# Patient Record
Sex: MALE | Race: WHITE | Employment: OTHER | URBAN - METROPOLITAN AREA
[De-identification: names, ages, dates, MRNs, and addresses within clinical notes are randomized per-mention and may not be internally consistent; named-entity substitution may affect disease eponyms.]

---

## 2017-05-13 PROCEDURE — 88305 TISSUE EXAM BY PATHOLOGIST: CPT | Performed by: INTERNAL MEDICINE

## 2017-05-17 ENCOUNTER — ANESTHESIA EVENT (OUTPATIENT)
Dept: SURGERY | Facility: HOSPITAL | Age: 72
End: 2017-05-17

## 2017-05-17 ENCOUNTER — SURGERY (OUTPATIENT)
Age: 72
End: 2017-05-17

## 2017-05-17 ENCOUNTER — HOSPITAL ENCOUNTER (OUTPATIENT)
Facility: HOSPITAL | Age: 72
Setting detail: HOSPITAL OUTPATIENT SURGERY
Discharge: HOME | End: 2017-05-17
Attending: OTOLARYNGOLOGY | Admitting: OTOLARYNGOLOGY
Payer: COMMERCIAL

## 2017-05-17 ENCOUNTER — ANESTHESIA (OUTPATIENT)
Dept: SURGERY | Facility: HOSPITAL | Age: 72
End: 2017-05-17

## 2017-05-17 VITALS
DIASTOLIC BLOOD PRESSURE: 74 MMHG | SYSTOLIC BLOOD PRESSURE: 152 MMHG | BODY MASS INDEX: 24.03 KG/M2 | RESPIRATION RATE: 16 BRPM | WEIGHT: 162.25 LBS | TEMPERATURE: 98 F | OXYGEN SATURATION: 97 % | HEIGHT: 69 IN | HEART RATE: 63 BPM

## 2017-05-17 DIAGNOSIS — E21.3 HYPERPARATHYROIDISM (HCC): ICD-10-CM

## 2017-05-17 PROCEDURE — 88332 PATH CONSLTJ SURG EA ADD BLK: CPT | Performed by: OTOLARYNGOLOGY

## 2017-05-17 PROCEDURE — 88331 PATH CONSLTJ SURG 1 BLK 1SPC: CPT | Performed by: OTOLARYNGOLOGY

## 2017-05-17 PROCEDURE — 88305 TISSUE EXAM BY PATHOLOGIST: CPT | Performed by: OTOLARYNGOLOGY

## 2017-05-17 PROCEDURE — 83970 ASSAY OF PARATHORMONE: CPT | Performed by: OTOLARYNGOLOGY

## 2017-05-17 PROCEDURE — 0GTP0ZZ RESECTION OF LEFT INFERIOR PARATHYROID GLAND, OPEN APPROACH: ICD-10-PCS | Performed by: OTOLARYNGOLOGY

## 2017-05-17 RX ORDER — CLINDAMYCIN PHOSPHATE 900 MG/50ML
INJECTION INTRAVENOUS
Status: DISCONTINUED
Start: 2017-05-17 | End: 2017-05-17

## 2017-05-17 RX ORDER — MULTIVITAMIN WITH IRON
100 TABLET ORAL DAILY
COMMUNITY
End: 2022-02-02 | Stop reason: ALTCHOICE

## 2017-05-17 RX ORDER — SODIUM CHLORIDE, SODIUM LACTATE, POTASSIUM CHLORIDE, CALCIUM CHLORIDE 600; 310; 30; 20 MG/100ML; MG/100ML; MG/100ML; MG/100ML
INJECTION, SOLUTION INTRAVENOUS CONTINUOUS
Status: DISCONTINUED | OUTPATIENT
Start: 2017-05-17 | End: 2017-05-17

## 2017-05-17 RX ORDER — HYDROCODONE BITARTRATE AND ACETAMINOPHEN 5; 325 MG/1; MG/1
2 TABLET ORAL AS NEEDED
Status: DISCONTINUED | OUTPATIENT
Start: 2017-05-17 | End: 2017-05-17

## 2017-05-17 RX ORDER — HYDROMORPHONE HYDROCHLORIDE 1 MG/ML
INJECTION, SOLUTION INTRAMUSCULAR; INTRAVENOUS; SUBCUTANEOUS
Status: COMPLETED
Start: 2017-05-17 | End: 2017-05-17

## 2017-05-17 RX ORDER — HYDROCODONE BITARTRATE AND ACETAMINOPHEN 5; 325 MG/1; MG/1
1 TABLET ORAL AS NEEDED
Status: DISCONTINUED | OUTPATIENT
Start: 2017-05-17 | End: 2017-05-17

## 2017-05-17 RX ORDER — HYDRALAZINE HYDROCHLORIDE 20 MG/ML
INJECTION INTRAMUSCULAR; INTRAVENOUS
Status: COMPLETED
Start: 2017-05-17 | End: 2017-05-17

## 2017-05-17 RX ORDER — CLINDAMYCIN PHOSPHATE 900 MG/50ML
900 INJECTION INTRAVENOUS ONCE
Status: COMPLETED | OUTPATIENT
Start: 2017-05-17 | End: 2017-05-17

## 2017-05-17 RX ORDER — CALCIUM CARBONATE 500(1250)
500 TABLET ORAL
Qty: 42 TABLET | Refills: 0 | Status: SHIPPED | OUTPATIENT
Start: 2017-05-17 | End: 2017-05-31

## 2017-05-17 RX ORDER — ASPIRIN 81 MG/1
81 TABLET ORAL DAILY
Status: ON HOLD | COMMUNITY
End: 2017-05-17

## 2017-05-17 RX ORDER — NALOXONE HYDROCHLORIDE 0.4 MG/ML
80 INJECTION, SOLUTION INTRAMUSCULAR; INTRAVENOUS; SUBCUTANEOUS AS NEEDED
Status: DISCONTINUED | OUTPATIENT
Start: 2017-05-17 | End: 2017-05-17

## 2017-05-17 RX ORDER — HYDRALAZINE HYDROCHLORIDE 20 MG/ML
10 INJECTION INTRAMUSCULAR; INTRAVENOUS ONCE
Status: COMPLETED | OUTPATIENT
Start: 2017-05-17 | End: 2017-05-17

## 2017-05-17 RX ORDER — DOCUSATE SODIUM 100 MG/1
100 CAPSULE, LIQUID FILLED ORAL 2 TIMES DAILY
Qty: 30 CAPSULE | Refills: 0 | Status: SHIPPED | OUTPATIENT
Start: 2017-05-17 | End: 2017-06-01

## 2017-05-17 RX ORDER — HYDROMORPHONE HYDROCHLORIDE 1 MG/ML
0.4 INJECTION, SOLUTION INTRAMUSCULAR; INTRAVENOUS; SUBCUTANEOUS EVERY 5 MIN PRN
Status: DISCONTINUED | OUTPATIENT
Start: 2017-05-17 | End: 2017-05-17

## 2017-05-17 RX ORDER — VITAMIN E 268 MG
400 CAPSULE ORAL DAILY
COMMUNITY

## 2017-05-17 RX ORDER — HYDROCODONE BITARTRATE AND ACETAMINOPHEN 5; 325 MG/1; MG/1
1 TABLET ORAL EVERY 4 HOURS PRN
Qty: 30 TABLET | Refills: 0 | Status: SHIPPED | OUTPATIENT
Start: 2017-05-17 | End: 2017-05-22 | Stop reason: ALTCHOICE

## 2017-05-17 RX ORDER — LIDOCAINE HYDROCHLORIDE AND EPINEPHRINE 10; 10 MG/ML; UG/ML
INJECTION, SOLUTION INFILTRATION; PERINEURAL AS NEEDED
Status: DISCONTINUED | OUTPATIENT
Start: 2017-05-17 | End: 2017-05-17 | Stop reason: HOSPADM

## 2017-05-17 RX ORDER — ONDANSETRON 2 MG/ML
4 INJECTION INTRAMUSCULAR; INTRAVENOUS AS NEEDED
Status: DISCONTINUED | OUTPATIENT
Start: 2017-05-17 | End: 2017-05-17

## 2017-05-17 NOTE — OR NURSING
Discharge instructions reviewed in depth with patient. Explained patients prescriptions multiple times, patient verbalized understanding. Instructions and prescriptions will be sent home with patient.

## 2017-05-17 NOTE — H&P
The history and physical have been reviewed by Dr Sabine Hilario and there are no interval changes. Okay to proceed with surgery.

## 2017-05-17 NOTE — ANESTHESIA PREPROCEDURE EVALUATION
PRE-OP EVALUATION    Patient Name: Alcira Lemus    Pre-op Diagnosis: Hyperparathyroidism ([facility] [address] [E21.3]    Procedure(s):  PARATHYROID EXPLORATION, POSSIBLE HEMITHYROIDECTOMY    Surgeon(s) and Role:     Renzo Singh MD - Primary     * Marilyn Dorsey MCG/ACT Nasal Suspension 2 sprays by Nasal route daily. Disp: 3 Bottle Rfl: 3   [DISCONTINUED] fluticasone-salmeterol (ADVAIR DISKUS) 250-50 MCG/DOSE Inhalation Aerosol Powder, Breath Activated Inhale 1 puff into the lungs every 12 (twelve) hours.  Disp: 1 seed implants at [facility] AT [facility]    CYSTOURETHROSCOPY  11/24/2009    Comment ALEJA franco-Dr. Lisbet Del Real SKIN MALIG 0.6-1CM TRUNK,ARM,LEG  5/18/2010    Comment by Dr Sancho Ramirez in office        Smoking status: Never Smoker     Smokeless tobacco: Never Used    Alcohol U

## 2017-05-17 NOTE — ANESTHESIA POSTPROCEDURE EVALUATION
[facility] Patient Status:  Outpatient in a Bed   Age/Gender 80year old male MRN [id number]   Location [address] [place] [address] Attending Latricia Maldonado MD   Hosp Day # 0 PCP Claudette Fuentes MD       Anesth

## 2017-05-17 NOTE — OR NURSING
Patient had called daughter for ride home, and she isn't able to come get him. He called a friend who will be coming to get him. Patients friend should be here around 36.

## 2017-05-22 PROCEDURE — 83970 ASSAY OF PARATHORMONE: CPT | Performed by: OTOLARYNGOLOGY

## 2017-11-04 PROCEDURE — 83970 ASSAY OF PARATHORMONE: CPT | Performed by: INTERNAL MEDICINE

## 2018-02-06 PROBLEM — R31.0 GROSS HEMATURIA: Status: ACTIVE | Noted: 2018-02-06

## 2018-02-06 PROBLEM — N32.0 BLADDER OUTLET OBSTRUCTION: Status: ACTIVE | Noted: 2018-02-06

## 2018-02-06 PROBLEM — Z92.3 HISTORY OF RADIATION THERAPY: Status: ACTIVE | Noted: 2018-02-06

## 2018-06-30 ENCOUNTER — OFFICE VISIT (OUTPATIENT)
Dept: URGENT CARE | Age: 73
End: 2018-06-30

## 2018-06-30 DIAGNOSIS — H10.9 CONJUNCTIVITIS OF LEFT EYE, UNSPECIFIED CONJUNCTIVITIS TYPE: Primary | ICD-10-CM

## 2018-06-30 PROCEDURE — 99202 OFFICE O/P NEW SF 15 MIN: CPT | Performed by: FAMILY MEDICINE

## 2018-06-30 RX ORDER — LISINOPRIL 20 MG/1
20 TABLET ORAL DAILY
COMMUNITY

## 2018-06-30 RX ORDER — MOXIFLOXACIN 5 MG/ML
1 SOLUTION/ DROPS OPHTHALMIC 3 TIMES DAILY
Qty: 3 ML | Refills: 0 | Status: SHIPPED | OUTPATIENT
Start: 2018-06-30

## 2018-06-30 RX ORDER — SILODOSIN 4 MG/1
CAPSULE ORAL
COMMUNITY

## 2018-06-30 ASSESSMENT — ENCOUNTER SYMPTOMS
EYE REDNESS: 1
COUGH: 0
EYE DISCHARGE: 1
RHINORRHEA: 0
ACTIVITY CHANGE: 0
SINUS PRESSURE: 0
FEVER: 0
EYE ITCHING: 0
APPETITE CHANGE: 0
SINUS PAIN: 0
FOREIGN BODY SENSATION: 0

## 2018-06-30 ASSESSMENT — PAIN SCALES - GENERAL: PAINLEVEL: 0

## 2018-11-02 PROBLEM — N32.0 BLADDER OUTLET OBSTRUCTION: Status: ACTIVE | Noted: 2018-11-02

## 2018-11-02 PROBLEM — R31.0 GROSS HEMATURIA: Status: RESOLVED | Noted: 2018-02-06 | Resolved: 2018-11-02

## 2018-11-02 PROBLEM — J45.40 MODERATE PERSISTENT ASTHMA WITHOUT COMPLICATION: Status: ACTIVE | Noted: 2018-11-02

## 2019-10-29 PROBLEM — I27.20 PULMONARY HYPERTENSION (HCC): Status: ACTIVE | Noted: 2019-10-29

## 2019-10-29 PROBLEM — Z85.46 HISTORY OF PROSTATE CANCER: Status: ACTIVE | Noted: 2019-10-29

## 2019-11-04 PROBLEM — N32.0 BLADDER OUTLET OBSTRUCTION: Status: RESOLVED | Noted: 2018-11-02 | Resolved: 2019-11-04

## 2020-11-06 PROBLEM — N18.30 CKD (CHRONIC KIDNEY DISEASE) STAGE 3, GFR 30-59 ML/MIN (HCC): Status: ACTIVE | Noted: 2020-11-06

## 2020-12-18 PROBLEM — I77.810 AORTIC ROOT DILATATION (HCC): Status: ACTIVE | Noted: 2020-12-18

## 2020-12-23 PROBLEM — E11.9 CONTROLLED TYPE 2 DIABETES MELLITUS, WITHOUT LONG-TERM CURRENT USE OF INSULIN (HCC): Status: RESOLVED | Noted: 2020-12-23 | Resolved: 2020-12-23

## 2020-12-23 PROBLEM — E11.9 CONTROLLED TYPE 2 DIABETES MELLITUS, WITHOUT LONG-TERM CURRENT USE OF INSULIN (HCC): Status: ACTIVE | Noted: 2020-12-23

## 2020-12-23 PROBLEM — I42.0 DILATED CARDIOMYOPATHY (HCC): Status: ACTIVE | Noted: 2020-12-23

## 2021-05-25 VITALS
SYSTOLIC BLOOD PRESSURE: 122 MMHG | HEIGHT: 69 IN | DIASTOLIC BLOOD PRESSURE: 84 MMHG | BODY MASS INDEX: 24.14 KG/M2 | TEMPERATURE: 97.5 F | OXYGEN SATURATION: 95 % | WEIGHT: 163 LBS | HEART RATE: 68 BPM | RESPIRATION RATE: 16 BRPM

## 2021-11-02 PROBLEM — I70.0 AORTIC ATHEROSCLEROSIS (HCC): Status: ACTIVE | Noted: 2021-11-02

## 2021-11-02 PROBLEM — C79.9 METASTASIS OF UNKNOWN ORIGIN (HCC): Status: ACTIVE | Noted: 2021-11-02

## 2021-11-15 PROBLEM — C79.9 METASTASIS OF UNKNOWN ORIGIN (HCC): Status: RESOLVED | Noted: 2021-11-02 | Resolved: 2021-11-15

## 2021-12-27 PROBLEM — I42.9 CARDIOMYOPATHY, UNSPECIFIED (HCC): Status: ACTIVE | Noted: 2020-12-23

## 2022-02-01 PROBLEM — Z95.2 S/P MVR (MITRAL VALVE REPLACEMENT): Status: ACTIVE | Noted: 2022-02-01

## 2022-02-01 PROBLEM — Z95.5 S/P CORONARY ARTERY STENT PLACEMENT: Status: ACTIVE | Noted: 2022-02-01

## 2022-02-01 PROBLEM — I25.10 CORONARY ARTERY DISEASE INVOLVING NATIVE CORONARY ARTERY OF NATIVE HEART WITHOUT ANGINA PECTORIS: Status: ACTIVE | Noted: 2022-02-01

## 2022-02-01 PROBLEM — E11.9 CONTROLLED TYPE 2 DIABETES MELLITUS WITHOUT COMPLICATION, WITHOUT LONG-TERM CURRENT USE OF INSULIN (HCC): Status: ACTIVE | Noted: 2022-02-01

## 2025-05-16 NOTE — OPERATIVE REPORT
PATIENT NAME: Annie Anaya   MRN: [id number]   DATE OF OPERATION: 5/17/2017     PREOPERATIVE DIAGNOSIS:    1. Hyperparathyroidism    POSTOPERATIVE DIAGNOSIS:   1. Hyperparathyroidism   2.  Parathyroid adenoma    PROCEDURE:    1. left inferior parathyroidec Patient is a 84y old  Male who presents with a chief complaint of acute hypoxic respiratory failure, acute hfref, pleural effusions (15 May 2025 21:41)      INTERVAL HPI/OVERNIGHT EVENTS:  - S/p Cardiomems yesterday  - Per report, noted decreased R DP post procedure, vascular consulted, no intervention, pending RLE arterial duplex     TODAY:  - Patient examined bedside, no complaints. Patient denies CP, SOB, dizziness, fever, chills, nausea, vomiting, constipation, diarrhea.    MEDICATIONS  (STANDING):  aspirin  chewable 81 milliGRAM(s) Oral daily  atorvastatin 40 milliGRAM(s) Oral at bedtime  fluticasone propionate/ salmeterol 100-50 MICROgram(s) Diskus 1 Dose(s) Inhalation two times a day  metoprolol succinate ER 12.5 milliGRAM(s) Oral daily  polyethylene glycol 3350 17 Gram(s) Oral daily  senna 2 Tablet(s) Oral at bedtime  tiotropium 2.5 MICROgram(s) Inhaler 2 Puff(s) Inhalation daily    MEDICATIONS  (PRN):  acetaminophen     Tablet .. 650 milliGRAM(s) Oral every 6 hours PRN Temp greater or equal to 38C (100.4F), Mild Pain (1 - 3)  aluminum hydroxide/magnesium hydroxide/simethicone Suspension 30 milliLiter(s) Oral every 4 hours PRN Dyspepsia  melatonin 3 milliGRAM(s) Oral at bedtime PRN Insomnia  midodrine. 5 milliGRAM(s) Oral three times a day PRN see below  ondansetron Injectable 4 milliGRAM(s) IV Push every 8 hours PRN Nausea and/or Vomiting      Allergies    No Known Allergies    Intolerances        REVIEW OF SYSTEMS:  Per HPI.      Vital Signs Last 24 Hrs  T(C): 36.5 (16 May 2025 05:00), Max: 36.9 (15 May 2025 08:06)  T(F): 97.7 (16 May 2025 05:00), Max: 98.5 (15 May 2025 08:06)  HR: 81 (16 May 2025 05:00) (70 - 83)  BP: 122/73 (16 May 2025 05:00) (104/60 - 149/78)  BP(mean): --  RR: 18 (16 May 2025 05:00) (16 - 22)  SpO2: 93% (16 May 2025 05:00) (93% - 98%)    Parameters below as of 16 May 2025 05:00  Patient On (Oxygen Delivery Method): room air        PHYSICAL EXAM:  GENERAL: No acute distress, comfortable in chair  HEENT: Atraumatic, normocephalic, non-icteric  NECK: Supple, full range of motion, no observable masses  NEURO: A&Ox3, no focal deficits, moving all extremities spontaneously, no dysarthria, CN II-XII grossly intact  PSYCH: Normal affect, appropriate insight and judgment, fluent speech  LUNGS: Decreased breath sounds on right, improved from prior, otherwise clear bl, s/p chest tube removal, dressing clean and dry   HEART: Irregular, systolic murmur   ABD: Soft, non-tender, non-distended, no appreciable masses  EXTREMITIES: Minimal pitting edema bl  SKIN: Warm and dry     LABS:                        10.1   7.00  )-----------( 248      ( 15 May 2025 03:54 )             32.8     05-15    143  |  101  |  25.3[H]  ----------------------------<  101[H]  4.1   |  29.0  |  1.06    Ca    8.8      15 May 2025 03:54    TPro  5.9[L]  /  Alb  2.8[L]  /  TBili  0.4  /  DBili  x   /  AST  19  /  ALT  11  /  AlkPhos  77  05-15    PT/INR - ( 15 May 2025 03:54 )   PT: 13.2 sec;   INR: 1.14 ratio         PTT - ( 15 May 2025 03:54 )  PTT:32.5 sec  Urinalysis Basic - ( 15 May 2025 03:54 )    Color: x / Appearance: x / SG: x / pH: x  Gluc: 101 mg/dL / Ketone: x  / Bili: x / Urobili: x   Blood: x / Protein: x / Nitrite: x   Leuk Esterase: x / RBC: x / WBC x   Sq Epi: x / Non Sq Epi: x / Bacteria: x      CAPILLARY BLOOD GLUCOSE          RADIOLOGY & ADDITIONAL TESTS:    Imaging Personally Reviewed:  [X] YES  [ ] NO    Consultant(s) Notes Reviewed:  [X] YES  [ ] NO    Care Discussed with Consultants/Other Providers [X] YES  [ ] NO    Plan of Care discussed with House Staff: [X]YES [ ] NO to separate them at the median raphe superiorly and inferiorly. The left strap muscles were then retracted with an [employer] and a mosquito was used to create a plane between the muscle and gland.  Once the straps were lateralized with an [employer] retracto Patient is a 84y old  Male who presents with a chief complaint of acute hypoxic respiratory failure, acute hfref, pleural effusions (15 May 2025 21:41)      INTERVAL HPI/OVERNIGHT EVENTS:  - S/p Cardiomems yesterday  - Per report, noted decreased R DP post procedure, vascular consulted, no intervention, pending RLE arterial duplex     TODAY:  - Patient examined bedside, no complaints. Reports tolerated procedure well. No feet pain, numbness, weakness, or sensory changes. Patient denies CP, SOB, dizziness, fever, chills, nausea, vomiting, constipation, diarrhea.    MEDICATIONS  (STANDING):  aspirin  chewable 81 milliGRAM(s) Oral daily  atorvastatin 40 milliGRAM(s) Oral at bedtime  fluticasone propionate/ salmeterol 100-50 MICROgram(s) Diskus 1 Dose(s) Inhalation two times a day  metoprolol succinate ER 12.5 milliGRAM(s) Oral daily  polyethylene glycol 3350 17 Gram(s) Oral daily  senna 2 Tablet(s) Oral at bedtime  tiotropium 2.5 MICROgram(s) Inhaler 2 Puff(s) Inhalation daily    MEDICATIONS  (PRN):  acetaminophen     Tablet .. 650 milliGRAM(s) Oral every 6 hours PRN Temp greater or equal to 38C (100.4F), Mild Pain (1 - 3)  aluminum hydroxide/magnesium hydroxide/simethicone Suspension 30 milliLiter(s) Oral every 4 hours PRN Dyspepsia  melatonin 3 milliGRAM(s) Oral at bedtime PRN Insomnia  midodrine. 5 milliGRAM(s) Oral three times a day PRN see below  ondansetron Injectable 4 milliGRAM(s) IV Push every 8 hours PRN Nausea and/or Vomiting      Allergies    No Known Allergies    Intolerances        REVIEW OF SYSTEMS:  Per HPI.      Vital Signs Last 24 Hrs  T(C): 36.5 (16 May 2025 05:00), Max: 36.9 (15 May 2025 08:06)  T(F): 97.7 (16 May 2025 05:00), Max: 98.5 (15 May 2025 08:06)  HR: 81 (16 May 2025 05:00) (70 - 83)  BP: 122/73 (16 May 2025 05:00) (104/60 - 149/78)  BP(mean): --  RR: 18 (16 May 2025 05:00) (16 - 22)  SpO2: 93% (16 May 2025 05:00) (93% - 98%)    Parameters below as of 16 May 2025 05:00  Patient On (Oxygen Delivery Method): room air        PHYSICAL EXAM:  GENERAL: No acute distress, comfortable in chair  HEENT: Atraumatic, normocephalic, non-icteric  NECK: Supple, full range of motion, no observable masses  NEURO: A&Ox3, no focal deficits, moving all extremities spontaneously, no dysarthria, CN II-XII grossly intact  PSYCH: Normal affect, appropriate insight and judgment, fluent speech  LUNGS: Decreased breath sounds on right, improved from prior, otherwise clear bl, s/p chest tube removal, dressing clean and dry   HEART: Irregular, systolic murmur   ABD: Soft, non-tender, non-distended, no appreciable masses, right groin dressing intact, clean and dry  EXTREMITIES: Minimal pitting edema bl, B/L feet warm and well perfused, strength 5/5 BLE  SKIN: Warm and dry     LABS:                        10.1   7.00  )-----------( 248      ( 15 May 2025 03:54 )             32.8     05-15    143  |  101  |  25.3[H]  ----------------------------<  101[H]  4.1   |  29.0  |  1.06    Ca    8.8      15 May 2025 03:54    TPro  5.9[L]  /  Alb  2.8[L]  /  TBili  0.4  /  DBili  x   /  AST  19  /  ALT  11  /  AlkPhos  77  05-15    PT/INR - ( 15 May 2025 03:54 )   PT: 13.2 sec;   INR: 1.14 ratio         PTT - ( 15 May 2025 03:54 )  PTT:32.5 sec  Urinalysis Basic - ( 15 May 2025 03:54 )    Color: x / Appearance: x / SG: x / pH: x  Gluc: 101 mg/dL / Ketone: x  / Bili: x / Urobili: x   Blood: x / Protein: x / Nitrite: x   Leuk Esterase: x / RBC: x / WBC x   Sq Epi: x / Non Sq Epi: x / Bacteria: x      CAPILLARY BLOOD GLUCOSE          RADIOLOGY & ADDITIONAL TESTS:    Imaging Personally Reviewed:  [X] YES  [ ] NO    Consultant(s) Notes Reviewed:  [X] YES  [ ] NO    Care Discussed with Consultants/Other Providers [X] YES  [ ] NO    Plan of Care discussed with House Staff: [X]YES [ ] NO Patient is a 84y old  Male who presents with a chief complaint of acute hypoxic respiratory failure, acute hfref, pleural effusions (15 May 2025 21:41)      INTERVAL HPI/ OVERNIGHT EVENTS:  - S/p Cardiomems yesterday  - Per report, noted decreased R DP post procedure, vascular consulted, no intervention, pending RLE arterial duplex     TODAY:  - Patient examined bedside, no complaints. Reports tolerated procedure well. No feet pain, numbness, weakness, or sensory changes. Patient denies CP, SOB, dizziness, fever, chills, nausea, vomiting, constipation, diarrhea.    MEDICATIONS  (STANDING):  aspirin  chewable 81 milliGRAM(s) Oral daily  atorvastatin 40 milliGRAM(s) Oral at bedtime  fluticasone propionate/ salmeterol 100-50 MICROgram(s) Diskus 1 Dose(s) Inhalation two times a day  metoprolol succinate ER 12.5 milliGRAM(s) Oral daily  polyethylene glycol 3350 17 Gram(s) Oral daily  senna 2 Tablet(s) Oral at bedtime  tiotropium 2.5 MICROgram(s) Inhaler 2 Puff(s) Inhalation daily    MEDICATIONS  (PRN):  acetaminophen     Tablet .. 650 milliGRAM(s) Oral every 6 hours PRN Temp greater or equal to 38C (100.4F), Mild Pain (1 - 3)  aluminum hydroxide/magnesium hydroxide/simethicone Suspension 30 milliLiter(s) Oral every 4 hours PRN Dyspepsia  melatonin 3 milliGRAM(s) Oral at bedtime PRN Insomnia  midodrine. 5 milliGRAM(s) Oral three times a day PRN see below  ondansetron Injectable 4 milliGRAM(s) IV Push every 8 hours PRN Nausea and/or Vomiting      Allergies    No Known Allergies    Intolerances        REVIEW OF SYSTEMS:  Per HPI.      Vital Signs Last 24 Hrs  T(C): 36.5 (16 May 2025 05:00), Max: 36.9 (15 May 2025 08:06)  T(F): 97.7 (16 May 2025 05:00), Max: 98.5 (15 May 2025 08:06)  HR: 81 (16 May 2025 05:00) (70 - 83)  BP: 122/73 (16 May 2025 05:00) (104/60 - 149/78)  BP(mean): --  RR: 18 (16 May 2025 05:00) (16 - 22)  SpO2: 93% (16 May 2025 05:00) (93% - 98%)    Parameters below as of 16 May 2025 05:00  Patient On (Oxygen Delivery Method): room air        PHYSICAL EXAM:  GENERAL: No acute distress, comfortable in chair  HEENT: Atraumatic, normocephalic, non-icteric  NECK: Supple, full range of motion, no observable masses  NEURO: A&Ox3, no focal deficits, moving all extremities spontaneously, no dysarthria, CN II-XII grossly intact  PSYCH: Normal affect, appropriate insight and judgment, fluent speech  LUNGS: Decreased breath sounds on right, improved from prior, otherwise clear bl, s/p chest tube removal, dressing clean and dry   HEART: Irregular, systolic murmur   ABD: Soft, non-tender, non-distended, no appreciable masses, right groin dressing intact, clean and dry  EXTREMITIES: Minimal pitting edema bl, B/L feet warm and well perfused, strength 5/5 BLE  SKIN: Warm and dry     LABS:                        10.1   7.00  )-----------( 248      ( 15 May 2025 03:54 )             32.8     05-15    143  |  101  |  25.3[H]  ----------------------------<  101[H]  4.1   |  29.0  |  1.06    Ca    8.8      15 May 2025 03:54    TPro  5.9[L]  /  Alb  2.8[L]  /  TBili  0.4  /  DBili  x   /  AST  19  /  ALT  11  /  AlkPhos  77  05-15    PT/INR - ( 15 May 2025 03:54 )   PT: 13.2 sec;   INR: 1.14 ratio         PTT - ( 15 May 2025 03:54 )  PTT:32.5 sec  Urinalysis Basic - ( 15 May 2025 03:54 )    Color: x / Appearance: x / SG: x / pH: x  Gluc: 101 mg/dL / Ketone: x  / Bili: x / Urobili: x   Blood: x / Protein: x / Nitrite: x   Leuk Esterase: x / RBC: x / WBC x   Sq Epi: x / Non Sq Epi: x / Bacteria: x      CAPILLARY BLOOD GLUCOSE          RADIOLOGY & ADDITIONAL TESTS:    Imaging Personally Reviewed:  [X] YES  [ ] NO    Consultant(s) Notes Reviewed:  [X] YES  [ ] NO    Care Discussed with Consultants/Other Providers [X] YES  [ ] NO    Plan of Care discussed with House Staff: [X]YES [ ] NO

## (undated) DEVICE — CAUTERY BLADE 2IN INS E1455

## (undated) DEVICE — GLOVE BIOGEL M SURG SZ 8

## (undated) DEVICE — BIPOLAR FORCEPS CORD,BANANA LEADS: Brand: VALLEYLAB

## (undated) DEVICE — SUTURE VICRYL 3-0 SH

## (undated) DEVICE — MEDI-VAC SUCTION FINE CAPACITY: Brand: CARDINAL HEALTH

## (undated) DEVICE — GLOVE ORTHO ALOETOUCH SZ 71/2

## (undated) DEVICE — PROBE 8225101 5PK STD PRASS FL TIP ROHS

## (undated) DEVICE — SPONGE: SPECIALTY PEANUT XR 100/CS: Brand: MEDICAL ACTION INDUSTRIES

## (undated) DEVICE — PREMIUM WET SKIN PREP TRAY: Brand: MEDLINE INDUSTRIES, INC.

## (undated) DEVICE — UNDYED BRAIDED (POLYGLACTIN 910), SYNTHETIC ABSORBABLE SUTURE: Brand: COATED VICRYL

## (undated) DEVICE — KENDALL SCD EXPRESS SLEEVES, KNEE LENGTH, MEDIUM: Brand: KENDALL SCD

## (undated) DEVICE — SUTURE SILK 3-0

## (undated) DEVICE — SOL  .9 1000ML BTL

## (undated) DEVICE — HEAD AND NECK CDS-LF: Brand: MEDLINE INDUSTRIES, INC.

## (undated) DEVICE — RETRACT LONE STAR STAYS DULL

## (undated) NOTE — LETTER
[facility] Testing Department  Phone: [phone]  OUTSIDE TESTING RESULT REQUEST      TO:  Nathaniel Tan   Today's Date: 4/12/17    FAX #: [phone]     IMPORTANT: FOR YOUR IMMEDIATE ATTENTION  Please FAX all test results listed below to: [phone]

## (undated) NOTE — IP AVS SNAPSHOT
[facility] [place] [address] [place], [address] ~ [phone]                Discharge Summary   5/17/2017    Jamison Ochoa           Admission Information        Provider Department    5/17/2017 Nayan Morris MD  Pre-Op / Asc Take 3 tablets (1,875 mg total) by mouth 2 (two) times daily with meals. Kirt Brian     [    ]    [    ]    [    ]    [    ]       Fluticasone Propionate 50 MCG/ACT Susp   Commonly known as:  FLONASE        2 sprays by Nasal route daily.     Constantin Francis, oozing from the wound edge. If bleeding develops, apply firm pressure with a gauze pad for 10-15 minutes. Keep the area dry for 48 hours after surgery. Afterwards, you may let water run over the area but do not soak or scrub vigorously.  Do not bathe or jonathan awakening in the morning. Concerns:  Bleeding: A small amount of bleeding may be normal. Please call the office or come to the Emergency Room should you develop brisk, new bleeding, which does not stop after a few minutes of holding pressure. Fever:  An Alyssa Neal is a Narcotic and can be constipating or upset your stomach  · Don't take Norco on an empty stomach  · Drink plenty of water  · Alcoholic beverages should be avoided while taking narcotics        Thank you for letting us care for you today and for ch ALT Bilirubin,Total Total Protein Albumin Sodium Potassium Chloride    (05/01/17)  56 (05/01/17)  0.53 (05/01/17)  7.0 (05/01/17)  3.9 (05/01/17)  145 (H) (05/01/17)  4.2 (05/01/17)  108      Radiology Exams     None         Additional Information       W